# Patient Record
(demographics unavailable — no encounter records)

---

## 2025-05-05 NOTE — HISTORY OF PRESENT ILLNESS
[de-identified] : 5-5-25 6yr F here for initial evaluation of frequent strep infections Had seen another ENT who had her scheduled for surgery, but then she was sick at the time and then wanted to switch doctors  >10 strep infections in last 12 months  All infections treated with abx  Now occurring nearly every 2 weeks  Mom thinks she is coming down with another infection currently  Has tested negative in between infections   Occasional snoring No pausing, choking, or gasping unless sick  Frequently gets bronchitis when sick  No daytime fatigue, restless sleep, focus problems, or hyperactivity  No chronic congestion   1-2 recent ear infections, no PNA infections No concerns with hearing or speech  Passed [facility]   NO family history of bleeding disorder or anesthesia complications  Not followed by any other specialists  No asthma or RAD

## 2025-05-05 NOTE — DATA REVIEWED
[FreeTextEntry1] :   The relevant medical records as well as any testing, imaging, and other order results were independently reviewed and interpreted by me and discussed with family.  PCP notes reviewed 12-20-23

## 2025-05-05 NOTE — ASSESSMENT
[FreeTextEntry1] : 6 year female with recurrent tonsillitis. Discussed criteria for tonsillectomy in setting of recurrent tonsillitis. Usually remove adenoids at the same time.   I discussed non-surgical strategies for chronic throat pain including saltwater gargles.  I instructed the family how to deliver this intervention.  Discussed risks, alternatives, and benefits of adenotonsillectomy including observation and medical treatment.  Risks of adenotonsillectomy discussed including, but not limited to, bleeding, infection, scarring, voice changes, pain, dehydration, persistent infections and regrowth of adenoids.  Briefly discussed risk of anesthesia but they will discuss more in depth with the anesthesiologist the day of the procedure.  Parent agreed to proceed to surgery and this will be scheduled accordingly.  Plan OR Tonsillectomy and Adenoidectomy (43811) [facility] or [facility] PCP clearance n/a